# Patient Record
Sex: FEMALE | Race: WHITE | NOT HISPANIC OR LATINO | URBAN - METROPOLITAN AREA
[De-identification: names, ages, dates, MRNs, and addresses within clinical notes are randomized per-mention and may not be internally consistent; named-entity substitution may affect disease eponyms.]

---

## 2017-02-28 ENCOUNTER — OFFICE (OUTPATIENT)
Dept: URBAN - METROPOLITAN AREA CLINIC 14 | Facility: CLINIC | Age: 62
End: 2017-02-28
Payer: MEDICARE

## 2017-02-28 VITALS
HEART RATE: 76 BPM | HEIGHT: 65 IN | DIASTOLIC BLOOD PRESSURE: 69 MMHG | WEIGHT: 189 LBS | SYSTOLIC BLOOD PRESSURE: 137 MMHG

## 2017-02-28 DIAGNOSIS — Z79.01 LONG TERM (CURRENT) USE OF ANTICOAGULANTS: ICD-10-CM

## 2017-02-28 DIAGNOSIS — R13.10 DYSPHAGIA, UNSPECIFIED: ICD-10-CM

## 2017-02-28 DIAGNOSIS — R10.13 EPIGASTRIC PAIN: ICD-10-CM

## 2017-02-28 DIAGNOSIS — Z12.11 ENCOUNTER FOR SCREENING FOR MALIGNANT NEOPLASM OF COLON: ICD-10-CM

## 2017-02-28 DIAGNOSIS — K21.9 GASTRO-ESOPHAGEAL REFLUX DISEASE WITHOUT ESOPHAGITIS: ICD-10-CM

## 2017-02-28 DIAGNOSIS — Z98.84 BARIATRIC SURGERY STATUS: ICD-10-CM

## 2017-02-28 DIAGNOSIS — E66.9 OBESITY, UNSPECIFIED: ICD-10-CM

## 2017-02-28 DIAGNOSIS — E11.9 TYPE 2 DIABETES MELLITUS WITHOUT COMPLICATIONS: ICD-10-CM

## 2017-02-28 DIAGNOSIS — I10 ESSENTIAL (PRIMARY) HYPERTENSION: ICD-10-CM

## 2017-02-28 PROCEDURE — G8427 DOCREV CUR MEDS BY ELIG CLIN: HCPCS | Performed by: NURSE PRACTITIONER

## 2017-02-28 PROCEDURE — 99204 OFFICE O/P NEW MOD 45 MIN: CPT | Performed by: NURSE PRACTITIONER

## 2017-02-28 RX ORDER — DEXLANSOPRAZOLE 60 MG/1
CAPSULE, DELAYED RELEASE ORAL
Qty: 30 | Refills: 5 | Status: ACTIVE
Start: 2017-02-28

## 2024-05-10 ENCOUNTER — OFFICE (OUTPATIENT)
Dept: URBAN - NONMETROPOLITAN AREA CLINIC 1 | Facility: CLINIC | Age: 69
End: 2024-05-10
Payer: OTHER GOVERNMENT

## 2024-05-10 VITALS
HEIGHT: 65 IN | WEIGHT: 152 LBS | SYSTOLIC BLOOD PRESSURE: 136 MMHG | HEART RATE: 78 BPM | DIASTOLIC BLOOD PRESSURE: 61 MMHG

## 2024-05-10 DIAGNOSIS — E78.5 HYPERLIPIDEMIA, UNSPECIFIED: ICD-10-CM

## 2024-05-10 DIAGNOSIS — D64.9 ANEMIA, UNSPECIFIED: ICD-10-CM

## 2024-05-10 DIAGNOSIS — I10 ESSENTIAL (PRIMARY) HYPERTENSION: ICD-10-CM

## 2024-05-10 DIAGNOSIS — I25.10 ATHEROSCLEROTIC HEART DISEASE OF NATIVE CORONARY ARTERY WITH: ICD-10-CM

## 2024-05-10 DIAGNOSIS — I63.9 CEREBRAL INFARCTION, UNSPECIFIED: ICD-10-CM

## 2024-05-10 DIAGNOSIS — E11.9 TYPE 2 DIABETES MELLITUS WITHOUT COMPLICATIONS: ICD-10-CM

## 2024-05-10 DIAGNOSIS — K21.9 GASTRO-ESOPHAGEAL REFLUX DISEASE WITHOUT ESOPHAGITIS: ICD-10-CM

## 2024-05-10 PROCEDURE — 99204 OFFICE O/P NEW MOD 45 MIN: CPT | Performed by: NURSE PRACTITIONER

## 2024-05-10 NOTE — SERVICEHPINOTES
She comes in today for hospital follow-up, to schedule a colonoscopy after hospitalization last month, with profound anemia. she is being evaluated at [facility] and has an upcoming appointment there.   She has normal bowel movements.  No abdominal pain or blood with her stools.  She had carotid stenting done earlier this year by Dr. Wiley at [facility].  She is taking Brilinta and we will need clearance to hold it prior to her colonoscopy.br
br  
br Dr. Chew' Consult note 4/12/24:
br   *This is a 69 year old with a past medical history of CVA x2 (2003, Jan 2024) CAD with PCI x1, DM, HLD, HTN, CABG, gastric sleeve surgery who comes to us as a transfer from [place] for anemia. She reports fatigue, weakness and mild dizziness for approx 4 days and called her PCP to make an appointment. They sent her home health nurse out for a lab draw and the hemoglobin resulted at 5. She was sent here for higher level of care. She had a stroke in January and was placed on baby aspirin and Plavix. She has done well in her recovery and is only left with some left sided weakness. She was in rehab for a time and is now back at home. She has home health and PT coming weekly to see her. She has felt very tired and weak for the past 4 days, barely being able to make it to the bathroom before feeling worn out. She denies any abdominal pain, nausea or vomiting. Since her stroke, she has been having decreased appetite and intake. No dysphagia or odynophagia. Occasional heartburn and she will take Tums as needed. No changes in bowel habits. She denies any melena or hematochezia. Last bowel movement was yesterday and reported as normal. She denies shortness of breath, chest pain, fever or chills. She has a similar episode last May and had an EGD and Colonoscopy. Procedure details outlined below. Her repeat hemoglobin after receiving 1 unit is 8.4.
br br-GI HISTORY: br5-2023, EGD, [facility]ido- Esophagus: 	Z line at 40. Stomach: Evidence of prior gastric surgery with a gastrojejunal anastomosis. The anastomosis appeared to be healthy. Staple line was still able to be seen. There was no ulceration seen at the anastomosis. This area was easily traversed without any issues. Jejunum. The blind limb of the jejunum was examined and was normal. The jejunum was examined was normal without any mass, lesions, ulcerations, or lesions that could have bled. Was not able to get to the duodenal jejunal anastomosis.br5-2023, Watson, Colonoscopy- 8 mm sessile cecal polyp that was left undisturbed due to the patient still being on Plavix. 3 mm sessile hepatic flexure polyp that was left undisturbed due to the patient still being on Plavix. 6 mm sessile rectal polyp that was left undisturbed due to the patient still being on Plavix. Grade I non-bleeding internal hemorrhoids seen during retroflexion. Colon otherwise normal on direct and retroflexion view. No signs of old or active bleeding. Will need to have a repeat colonoscopy within 3-6 months for polypectomy.
br

br -EGD 4/12/24 by Dr. Chew-br
Findings:brEsophagus: The esophageal mucosa was normal with no esophagitis, Longo's esophagus, varices, mass, or stricture.brGE Junction: A 2 centimeter hiatal hernia was noted.brStomach: There was evidence of prior gastric bypass. The gastric remnant appeared completely normal. The gastrojejunal anastomosis was widely patent and appeared completely normal without evidence of ulceration or stricture.brJejunum: The small bowel limb was entered as far as possible and appeared completely normal.brRecommendations: brNo blood or source of bleeding noted on EGD brReturn to floor and resume previous medications and activity brWill start regular dietbr*Suspect anemia is likely multifactorial and largely secondary to malabsorptionbrWill arrange outpatient colonoscopy to continue workup of anemia and remove numerous polyps noted on last colonoscopy Chantel will sign off. Please call with any further questionsbr

## 2024-05-10 NOTE — SERVICENOTES
Once we have clearance to hold her Eliquis, colonoscopy can be scheduled.
The bowel prep was prescribed and instructions were provided.  
The risks for colonoscopy were discussed with her and she agrees to proceed.

## 2024-08-08 ENCOUNTER — OFFICE (OUTPATIENT)
Dept: URBAN - NONMETROPOLITAN AREA CLINIC 1 | Facility: CLINIC | Age: 69
End: 2024-08-08
Payer: MEDICARE

## 2024-08-08 VITALS
WEIGHT: 148 LBS | HEART RATE: 98 BPM | SYSTOLIC BLOOD PRESSURE: 116 MMHG | DIASTOLIC BLOOD PRESSURE: 78 MMHG | HEIGHT: 65 IN

## 2024-08-08 DIAGNOSIS — E78.5 HYPERLIPIDEMIA, UNSPECIFIED: ICD-10-CM

## 2024-08-08 DIAGNOSIS — K21.9 GASTRO-ESOPHAGEAL REFLUX DISEASE WITHOUT ESOPHAGITIS: ICD-10-CM

## 2024-08-08 DIAGNOSIS — I25.10 ATHEROSCLEROTIC HEART DISEASE OF NATIVE CORONARY ARTERY WITH: ICD-10-CM

## 2024-08-08 DIAGNOSIS — I63.9 CEREBRAL INFARCTION, UNSPECIFIED: ICD-10-CM

## 2024-08-08 DIAGNOSIS — Z86.010 PERSONAL HISTORY OF COLONIC POLYPS: ICD-10-CM

## 2024-08-08 DIAGNOSIS — I10 ESSENTIAL (PRIMARY) HYPERTENSION: ICD-10-CM

## 2024-08-08 DIAGNOSIS — E11.9 TYPE 2 DIABETES MELLITUS WITHOUT COMPLICATIONS: ICD-10-CM

## 2024-08-08 DIAGNOSIS — D64.9 ANEMIA, UNSPECIFIED: ICD-10-CM

## 2024-08-08 PROCEDURE — 99214 OFFICE O/P EST MOD 30 MIN: CPT | Performed by: NURSE PRACTITIONER

## 2024-08-08 NOTE — SERVICEHPINOTES
She comes in today for hospital follow-up, to schedule a colonoscopy after hospitalization in April, with profound anemia. I saw her for the same in May, but clearance was not obtained to hold her Brilinta.   
Roman has normal bowel movements.  No abdominal pain or blood with her stools.  She is being followed at the [facility] for the anemia.  She had carotid stenting done earlier this year by Dr. Wiley at [facility].  Her cardiologist is Dr Burgess with the [facility].  She has an appointment with Dr. Harris next week in [place]. She is taking Brilinta and we will need clearance to hold it prior to her colonoscopy.Pasquale Chew' Consult note 4/12/24:br*This is a 69 year old with a past medical history of CVA x2 (2003, Jan 2024) CAD with PCI x1, DM, HLD, HTN, CABG, gastric sleeve surgery who comes to us as a transfer from [place] for anemia. She reports fatigue, weakness and mild dizziness for approx 4 days and called her PCP to make an appointment. They sent her home health nurse out for a lab draw and the hemoglobin resulted at 5. She was sent here for higher level of care. She had a stroke in January and was placed on baby aspirin and Plavix. She has done well in her recovery and is only left with some left sided weakness. She was in rehab for a time and is now back at home. She has home health and PT coming weekly to see her. She has felt very tired and weak for the past 4 days, barely being able to make it to the bathroom before feeling worn out. She denies any abdominal pain, nausea or vomiting. Since her stroke, she has been having decreased appetite and intake. No dysphagia or odynophagia. Occasional heartburn and she will take Tums as needed. No changes in bowel habits. She denies any melena or hematochezia. Last bowel movement was yesterday and reported as normal. She denies shortness of breath, chest pain, fever or chills. She has a similar episode last May and had an EGD and Colonoscopy. Procedure details outlined below. Her repeat hemoglobin after receiving 1 unit is 8.4.-GI HISTORY:br5-2023, EGD, [facility]- Esophagus: Z line at 40. Stomach: Evidence of prior gastric surgery with a gastrojejunal anastomosis. The anastomosis appeared to be healthy. Staple line was still able to be seen. There was no ulceration seen at the anastomosis. This area was easily traversed without any issues. Jejunum. The blind limb of the jejunum was examined and was normal. The jejunum was examined was normal without any mass, lesions, ulcerations, or lesions that could have bled. Was not able to get to the duodenal jejunal anastomosis.br5-2023, [facility], Colonoscopy- 8 mm sessile cecal polyp that was left undisturbed due to the patient still being on Plavix. 3 mm sessile hepatic flexure polyp that was left undisturbed due to the patient still being on Plavix. 6 mm sessile rectal polyp that was left undisturbed due to the patient still being on Plavix. Grade I non-bleeding internal hemorrhoids seen during retroflexion. Colon otherwise normal on direct and retroflexion view. No signs of old or active bleeding. Will need to have a repeat colonoscopy within 3-6 months for polypectomy.-EGD 4/12/24 by Dr. Chew-Manasa:brEsophagus: The esophageal mucosa was normal with no esophagitis, Longo's esophagus, varices, mass, or stricture.brGE Junction: A 2 centimeter hiatal hernia was noted.brStomach: There was evidence of prior gastric bypass. The gastric remnant appeared completely normal. The gastrojejunal anastomosis was widely patent and appeared completely normal without evidence of ulceration or stricture.brJejunum: The small bowel limb was entered as far as possible and appeared completely normal.brRecommendations:brNo blood or source of bleeding noted on EGDbrReturn to floor and resume previous medications and activitybrWill start regular dietbr*Suspect anemia is likely multifactorial and largely secondary to malabsorptionbrWill arrange outpatient colonoscopy to continue workup of anemia and remove numerous polyps noted on last colonoscopybrI will sign off. Please call with any further questions

## 2024-08-08 NOTE — SERVICENOTES
once we have clearance to hold her Brilinta 3 days, her colonoscopy can be arranged.  She has an appointment with her cardiologist next week, a clearance form was given to her to take with her.

## 2024-10-18 ENCOUNTER — ON CAMPUS - OUTPATIENT (OUTPATIENT)
Dept: URBAN - NONMETROPOLITAN AREA HOSPITAL 34 | Facility: HOSPITAL | Age: 69
End: 2024-10-18
Payer: MEDICARE

## 2024-10-18 DIAGNOSIS — D12.0 BENIGN NEOPLASM OF CECUM: ICD-10-CM

## 2024-10-18 DIAGNOSIS — K63.5 POLYP OF COLON: ICD-10-CM

## 2024-10-18 DIAGNOSIS — Z09 ENCOUNTER FOR FOLLOW-UP EXAMINATION AFTER COMPLETED TREATMEN: ICD-10-CM

## 2024-10-18 DIAGNOSIS — Z86.0101 PERSONAL HISTORY OF ADENOMATOUS AND SERRATED COLON POLYPS: ICD-10-CM

## 2024-10-18 PROCEDURE — 45385 COLONOSCOPY W/LESION REMOVAL: CPT | Mod: PT | Performed by: INTERNAL MEDICINE
